# Patient Record
Sex: FEMALE | Race: WHITE | NOT HISPANIC OR LATINO
[De-identification: names, ages, dates, MRNs, and addresses within clinical notes are randomized per-mention and may not be internally consistent; named-entity substitution may affect disease eponyms.]

---

## 2020-06-10 ENCOUNTER — APPOINTMENT (OUTPATIENT)
Dept: VASCULAR SURGERY | Facility: CLINIC | Age: 60
End: 2020-06-10
Payer: COMMERCIAL

## 2020-06-10 PROCEDURE — 99204 OFFICE O/P NEW MOD 45 MIN: CPT

## 2020-08-07 ENCOUNTER — APPOINTMENT (OUTPATIENT)
Dept: VASCULAR SURGERY | Facility: CLINIC | Age: 60
End: 2020-08-07
Payer: COMMERCIAL

## 2020-08-07 PROCEDURE — 99214 OFFICE O/P EST MOD 30 MIN: CPT

## 2020-10-02 ENCOUNTER — APPOINTMENT (OUTPATIENT)
Dept: VASCULAR SURGERY | Facility: CLINIC | Age: 60
End: 2020-10-02
Payer: COMMERCIAL

## 2020-10-02 PROBLEM — Z00.00 ENCOUNTER FOR PREVENTIVE HEALTH EXAMINATION: Status: ACTIVE | Noted: 2020-10-02

## 2020-10-02 PROCEDURE — 99213 OFFICE O/P EST LOW 20 MIN: CPT

## 2020-10-28 DIAGNOSIS — Z51.89 ENCOUNTER FOR OTHER SPECIFIED AFTERCARE: ICD-10-CM

## 2020-10-29 ENCOUNTER — APPOINTMENT (OUTPATIENT)
Dept: VASCULAR SURGERY | Facility: CLINIC | Age: 60
End: 2020-10-29
Payer: COMMERCIAL

## 2020-10-29 VITALS — DIASTOLIC BLOOD PRESSURE: 90 MMHG | SYSTOLIC BLOOD PRESSURE: 148 MMHG | HEART RATE: 69 BPM

## 2020-10-29 PROCEDURE — 36475 ENDOVENOUS RF 1ST VEIN: CPT

## 2020-10-29 PROCEDURE — 99072 ADDL SUPL MATRL&STAF TM PHE: CPT

## 2020-10-29 NOTE — PROCEDURE
[FreeTextEntry1] : right leg RFA of GSV [FreeTextEntry2] : symptomatic venous reflux [FreeTextEntry3] : The patient was seen in the waiting room where the consent was obtained. She was then taken to the procedure room where a timeout was called to verify the patient's identity and the laterality of the procedure. The patient's right leg was then interrogated under ultrasound and an appropriate place for cannulation was identified. The leg was then prepped and draped in the standard fashion. Under ultrasound guidance the patient was given an injection of 1% plain lidocaine in the distal thigh. Access was then obtained with a 7 FR sheath in the standard fashion. Catheter was placed to the SFJ and withdrawn 2.5 cm from the junction. Patient was then given tumescence around the saphenous vein under ultrasound guidance. The ablation was then performed (9 cycles for 3 minutes at 120 celsius). Steri strips were applied to the catheter insertion site. Leg was wrapped in kerlix, and an ace wrap. Patient was discharged in stable condition.

## 2020-11-05 ENCOUNTER — APPOINTMENT (OUTPATIENT)
Dept: VASCULAR SURGERY | Facility: CLINIC | Age: 60
End: 2020-11-05
Payer: COMMERCIAL

## 2020-11-05 VITALS — HEART RATE: 66 BPM | DIASTOLIC BLOOD PRESSURE: 77 MMHG | SYSTOLIC BLOOD PRESSURE: 153 MMHG

## 2020-11-05 DIAGNOSIS — Z78.0 ASYMPTOMATIC MENOPAUSAL STATE: ICD-10-CM

## 2020-11-05 PROCEDURE — 99072 ADDL SUPL MATRL&STAF TM PHE: CPT

## 2020-11-05 PROCEDURE — 93971 EXTREMITY STUDY: CPT

## 2020-11-05 PROCEDURE — 99214 OFFICE O/P EST MOD 30 MIN: CPT

## 2020-11-05 RX ORDER — CETIRIZINE HCL 10 MG
TABLET ORAL
Refills: 0 | Status: DISCONTINUED | COMMUNITY

## 2020-11-05 RX ORDER — MONTELUKAST 10 MG/1
10 TABLET, FILM COATED ORAL
Refills: 0 | Status: DISCONTINUED | COMMUNITY

## 2020-11-05 NOTE — PHYSICAL EXAM
[Normal Breath Sounds] : Normal breath sounds [2+] : left 2+ [Alert] : alert [Oriented to Person] : oriented to person [Oriented to Place] : oriented to place [Calm] : calm [de-identified] : NAD [de-identified] : NCAT [de-identified] : supple [de-identified] : soft, NT, ND [de-identified] : b/l LE dermatosclerosis

## 2020-11-05 NOTE — HISTORY OF PRESENT ILLNESS
[FreeTextEntry1] : 61 y/o F referred by Dr. Mcneil for evaluation of bilateral leg edema.\par She developed swelling associated with redness of both legs a couple weeks ago. She was evaluated by her PCP, who prescribed abx for cellulitis, as well as recommended a cardiac eval. She was seen by her cardiologist, a echo was normal and a venous DUS of the legs ruled out DVT.\par She continues to have swelling of her legs, although the redness has improved. She denies fevers or chills.\par She has no past hx of DVT or PE.\par She reports that the swelling is improved in the morning, and worsens as the day progresses.\par \par Interval hx:\par 10/2/20 - She has been compliant with the daily use of 20-30mm hg compression garments as instructed. Despite this, she continues to have swelling, heaviness and aching of her legs.\par \par 11/5/20 - 1 week s/p right leg RFA of GSV. Right leg feels well, she reports that the heaviness and swelling is improved.\par She continues to have swelling, heaviness and aching of her left leg despite the daily use of compression garments.

## 2020-11-05 NOTE — ASSESSMENT
[FreeTextEntry1] : 59 y/o F w/ chronic b/l LE edema and episodes of stasis dermatitis, cellulitis\par Dermatitis has healed with application of clobetazol ointment and compression\par She underwent RFA of R GSV for C4 venous disease with good results. \par She continues to have heaviness and aching of her left leg, despite the daily use of compression garments.\par Again discussed risks, benefits of treatment of left sided superficial reflux with RFA, she wishes to proceed.\par Will schedule LLE RFA of GSV as per patient's schedule.\par Continue compression garments.

## 2020-12-03 ENCOUNTER — APPOINTMENT (OUTPATIENT)
Dept: VASCULAR SURGERY | Facility: CLINIC | Age: 60
End: 2020-12-03
Payer: COMMERCIAL

## 2020-12-03 VITALS — SYSTOLIC BLOOD PRESSURE: 156 MMHG | DIASTOLIC BLOOD PRESSURE: 75 MMHG

## 2020-12-03 PROCEDURE — 99072 ADDL SUPL MATRL&STAF TM PHE: CPT

## 2020-12-03 PROCEDURE — 99214 OFFICE O/P EST MOD 30 MIN: CPT

## 2020-12-07 NOTE — HISTORY OF PRESENT ILLNESS
[FreeTextEntry1] : 61 y/o F referred by Dr. Mcneil for evaluation of bilateral leg edema.\par She developed swelling associated with redness of both legs a couple weeks ago. She was evaluated by her PCP, who prescribed abx for cellulitis, as well as recommended a cardiac eval. She was seen by her cardiologist, a echo was normal and a venous DUS of the legs ruled out DVT.\par She continues to have swelling of her legs, although the redness has improved. She denies fevers or chills.\par She has no past hx of DVT or PE.\par She reports that the swelling is improved in the morning, and worsens as the day progresses.\par \par Interval hx:\par 10/2/20 - She has been compliant with the daily use of 20-30mm hg compression garments as instructed. Despite this, she continues to have swelling, heaviness and aching of her legs.\par \par 11/5/20 - 1 week s/p right leg RFA of GSV. Right leg feels well, she reports that the heaviness and swelling is improved.\par She continues to have swelling, heaviness and aching of her left leg despite the daily use of compression garments. [de-identified] : 12/3/20 - She has been compliant with the use of bilateral grade 2 compression garments as instructed.  She reports that she has had venous swelling and aching, more pronounced on the left leg.

## 2020-12-07 NOTE — ASSESSMENT
[FreeTextEntry1] : 59 y/o F w/ chronic b/l LE edema and episodes of stasis dermatitis, cellulitis\par Dermatitis has healed with application of clobetazol ointment and compression\par She underwent RFA of R GSV for C4 venous disease with good results. \par She continues to have heaviness and aching of her left leg, despite the daily use of compression garments.\par \par She will continue the use of compression garments of both legs.  She will follow-up with me in approximately 2 to 3 months to restudy her symptoms.  If there is no improvement on the left side, we may proceed with left-sided GSV ablation.

## 2020-12-07 NOTE — PHYSICAL EXAM
[Normal Breath Sounds] : Normal breath sounds [2+] : left 2+ [Alert] : alert [Oriented to Person] : oriented to person [Oriented to Place] : oriented to place [Calm] : calm [de-identified] : NAD [de-identified] : NCAT [de-identified] : supple [de-identified] : soft, NT, ND [de-identified] : b/l LE dermatosclerosis

## 2021-03-04 ENCOUNTER — APPOINTMENT (OUTPATIENT)
Dept: VASCULAR SURGERY | Facility: CLINIC | Age: 61
End: 2021-03-04

## 2021-09-13 ENCOUNTER — NON-APPOINTMENT (OUTPATIENT)
Age: 61
End: 2021-09-13

## 2021-09-23 ENCOUNTER — APPOINTMENT (OUTPATIENT)
Dept: VASCULAR SURGERY | Facility: CLINIC | Age: 61
End: 2021-09-23
Payer: COMMERCIAL

## 2021-09-23 DIAGNOSIS — M79.89 OTHER SPECIFIED SOFT TISSUE DISORDERS: ICD-10-CM

## 2021-09-23 PROCEDURE — 99213 OFFICE O/P EST LOW 20 MIN: CPT

## 2021-09-23 PROCEDURE — 93971 EXTREMITY STUDY: CPT

## 2021-09-24 NOTE — ASSESSMENT
[FreeTextEntry1] : 61-year-old female with somewhat acute onset left leg edema on background of superficial venous reflux and obesity.\par Venous duplex in the office was performed which was negative for DVT or SVT.  Her edema is resolving over the past 2 weeks, and she is now wearing compression garments which is helping her get back to baseline.\par She will continue to use her compression therapy on a daily basis.  If she develops worsening edema or pain related to her varicose veins, we will reassess and possibly treat her superficial venous reflux.  She will follow up with me in 3 months, sooner if any problems arise.

## 2021-09-24 NOTE — HISTORY OF PRESENT ILLNESS
[FreeTextEntry1] : 61 y/o F referred by Dr. Mcneil for evaluation of bilateral leg edema.\par She developed swelling associated with redness of both legs a couple weeks ago. She was evaluated by her PCP, who prescribed abx for cellulitis, as well as recommended a cardiac eval. She was seen by her cardiologist, a echo was normal and a venous DUS of the legs ruled out DVT.\par She continues to have swelling of her legs, although the redness has improved. She denies fevers or chills.\par She has no past hx of DVT or PE.\par She reports that the swelling is improved in the morning, and worsens as the day progresses.\par \par Interval hx:\par 10/2/20 - She has been compliant with the daily use of 20-30mm hg compression garments as instructed. Despite this, she continues to have swelling, heaviness and aching of her legs.\par \par 11/5/20 - 1 week s/p right leg RFA of GSV. Right leg feels well, she reports that the heaviness and swelling is improved.\par She continues to have swelling, heaviness and aching of her left leg despite the daily use of compression garments. [de-identified] : 12/3/20 - She has been compliant with the use of bilateral grade 2 compression garments as instructed.  She reports that she has had venous swelling and aching, more pronounced on the left leg.\par \par 9/23/21 - She reports that she acute onset of leg swelling over the last couple weeks.  This is slowly subsided with time.  She is now wearing compression garments.

## 2021-09-24 NOTE — PHYSICAL EXAM
[Normal Breath Sounds] : Normal breath sounds [2+] : left 2+ [Alert] : alert [Oriented to Person] : oriented to person [Oriented to Place] : oriented to place [Calm] : calm [de-identified] : NAD [de-identified] : supple [de-identified] : NCAT [de-identified] : soft, NT, ND [de-identified] : b/l LE dermatosclerosis

## 2021-10-08 PROBLEM — M79.89 LEG SWELLING: Status: ACTIVE | Noted: 2021-09-24

## 2022-01-13 ENCOUNTER — APPOINTMENT (OUTPATIENT)
Dept: VASCULAR SURGERY | Facility: CLINIC | Age: 62
End: 2022-01-13

## 2023-06-16 ENCOUNTER — TRANSCRIPTION ENCOUNTER (OUTPATIENT)
Age: 63
End: 2023-06-16

## 2023-09-25 ENCOUNTER — APPOINTMENT (OUTPATIENT)
Dept: VASCULAR SURGERY | Facility: CLINIC | Age: 63
End: 2023-09-25
Payer: COMMERCIAL

## 2023-09-25 VITALS
HEIGHT: 61.42 IN | SYSTOLIC BLOOD PRESSURE: 140 MMHG | WEIGHT: 227 LBS | DIASTOLIC BLOOD PRESSURE: 80 MMHG | BODY MASS INDEX: 42.31 KG/M2 | HEART RATE: 82 BPM | OXYGEN SATURATION: 99 %

## 2023-09-25 DIAGNOSIS — I87.2 VENOUS INSUFFICIENCY (CHRONIC) (PERIPHERAL): ICD-10-CM

## 2023-09-25 PROCEDURE — 99213 OFFICE O/P EST LOW 20 MIN: CPT

## 2023-09-25 RX ORDER — CHOLECALCIFEROL (VITAMIN D3) 1250 MCG
1.25 MG CAPSULE ORAL
Refills: 0 | Status: ACTIVE | COMMUNITY

## 2023-09-25 RX ORDER — CETIRIZINE HCL 5 MG
5 TABLET ORAL
Refills: 0 | Status: ACTIVE | COMMUNITY

## 2023-09-25 RX ORDER — ALPRAZOLAM 0.5 MG/1
0.5 TABLET ORAL
Qty: 1 | Refills: 0 | Status: DISCONTINUED | COMMUNITY
Start: 2020-10-28 | End: 2023-09-25

## 2023-09-25 RX ORDER — CETIRIZINE HYDROCHLORIDE 10 MG/1
10 TABLET, COATED ORAL
Refills: 0 | Status: ACTIVE | COMMUNITY

## 2023-10-08 ENCOUNTER — RESULT REVIEW (OUTPATIENT)
Age: 63
End: 2023-10-08

## 2023-10-19 ENCOUNTER — APPOINTMENT (OUTPATIENT)
Dept: VASCULAR SURGERY | Facility: CLINIC | Age: 63
End: 2023-10-19
Payer: COMMERCIAL

## 2023-10-19 VITALS
HEART RATE: 75 BPM | BODY MASS INDEX: 42.31 KG/M2 | WEIGHT: 227 LBS | DIASTOLIC BLOOD PRESSURE: 76 MMHG | SYSTOLIC BLOOD PRESSURE: 132 MMHG | HEIGHT: 61.42 IN

## 2023-10-19 PROCEDURE — 99213 OFFICE O/P EST LOW 20 MIN: CPT
